# Patient Record
(demographics unavailable — no encounter records)

---

## 2024-12-01 NOTE — PHYSICAL EXAM
[No Acute Distress] : no acute distress [Well Nourished] : well nourished [Well Developed] : well developed [Well-Appearing] : well-appearing [Normal Sclera/Conjunctiva] : normal sclera/conjunctiva [PERRL] : pupils equal round and reactive to light [EOMI] : extraocular movements intact [Normal Outer Ear/Nose] : the outer ears and nose were normal in appearance [Normal Oropharynx] : the oropharynx was normal [No JVD] : no jugular venous distention [No Lymphadenopathy] : no lymphadenopathy [Supple] : supple [Thyroid Normal, No Nodules] : the thyroid was normal and there were no nodules present [No Respiratory Distress] : no respiratory distress  [No Accessory Muscle Use] : no accessory muscle use [Clear to Auscultation] : lungs were clear to auscultation bilaterally [Normal Rate] : normal rate  [Normal S1, S2] : normal S1 and S2 [Regular Rhythm] : with a regular rhythm [No Murmur] : no murmur heard [No Carotid Bruits] : no carotid bruits [No Abdominal Bruit] : a ~M bruit was not heard ~T in the abdomen [No Varicosities] : no varicosities [Pedal Pulses Present] : the pedal pulses are present [No Edema] : there was no peripheral edema [No Extremity Clubbing/Cyanosis] : no extremity clubbing/cyanosis [No Palpable Aorta] : no palpable aorta [Soft] : abdomen soft [Non Tender] : non-tender [Non-distended] : non-distended [No Masses] : no abdominal mass palpated [No HSM] : no HSM [Normal Bowel Sounds] : normal bowel sounds [Normal Posterior Cervical Nodes] : no posterior cervical lymphadenopathy [Normal Anterior Cervical Nodes] : no anterior cervical lymphadenopathy [No CVA Tenderness] : no CVA  tenderness [No Spinal Tenderness] : no spinal tenderness [No Joint Swelling] : no joint swelling [Grossly Normal Strength/Tone] : grossly normal strength/tone [No Rash] : no rash [Coordination Grossly Intact] : coordination grossly intact [No Focal Deficits] : no focal deficits [Normal Gait] : normal gait [Normal Affect] : the affect was normal [Deep Tendon Reflexes (DTR)] : deep tendon reflexes were 2+ and symmetric [Normal Insight/Judgement] : insight and judgment were intact

## 2024-12-02 NOTE — HISTORY OF PRESENT ILLNESS
[FreeTextEntry1] : JAYLA DAY is a 56 year old female here for a follow up visit. [de-identified] : She has a history of high cholesterol, migraine HA, GERD, anxiety, and depression. At her annual physical in 5/2024 her BMI was 27.7 and she requested a prescription for Zepbound. She was prescribed 2.5 mg weekly which she took for 2 months. In July her dose was increased to 5 mg. It was increased again to 7.5 mg in August, 10 mg in September, and 12.5 mg in November. She has had one follow up visit in August at which time she had lost 15 pounds on the 5 mg dose and her BMI was normal at 25.3.

## 2024-12-02 NOTE — PLAN
[FreeTextEntry1] : Check labs as above. Will adjust any medications based upon lab results. Recommend she decrease her Zepbound dose with the goal of getting back to 5 mg which was an effective dose. Ideally she should be able to stop the GLP-1 agonist completely  Reviewed age-appropriate preventive screening tests with patient.  Discussed clean eating (eg Mediterranean style eating plan) and regular exercise/staying as physically active as possible.  Include balance exercises and strength training and core strengthening exercises for bone health and to decrease risk for falls.  Reviewed importance of good self care (e.g. meditation, yoga, adequate rest, regular exercise, magnesium, clean eating, etc.).  Follow up for next physical in 3/2025 as scheduled.  Face-to-face time spent with patient, over half in discussion of the above diagnoses and treatment plan: 30 minutes.

## 2024-12-02 NOTE — ASSESSMENT
[FreeTextEntry1] : She has a history of high cholesterol, migraine HA, GERD, anxiety, and depression. She is taking Zepbound for weight loss. At her last visit in 8/2024 she had a normal BMI on the 5 mg dose. Despite this, her dose continued to be increased on a monthly basis.  Her weight today is 137 and her BMI 22.11. She is happy with her weight loss but would like to lose another few pounds if possible.  Her insurance is changing and she is not sure if Zepbound will be covered on her new insurance. We discussed that the goal is always to try to taper off the GLP-1 agonist and not have to stay on this lifelong. If her new insurance does not cover Zepbound this may accelerate that process. If they do cover it, she would like to say on 12.5 mg for another few months before decreasing the dose.

## 2024-12-02 NOTE — HEALTH RISK ASSESSMENT
[0] : 2) Feeling down, depressed, or hopeless: Not at all (0) [PHQ-2 Negative - No further assessment needed] : PHQ-2 Negative - No further assessment needed [Never] : Never [[id number]] : 0

## 2024-12-02 NOTE — HEALTH RISK ASSESSMENT
[0] : 2) Feeling down, depressed, or hopeless: Not at all (0) [PHQ-2 Negative - No further assessment needed] : PHQ-2 Negative - No further assessment needed [Never] : Never [[id number]] : 0

## 2024-12-02 NOTE — HISTORY OF PRESENT ILLNESS
[FreeTextEntry1] : JAYLA DAY is a 56 year old female here for a follow up visit. [de-identified] : She has a history of high cholesterol, migraine HA, GERD, anxiety, and depression. At her annual physical in 5/2024 her BMI was 27.7 and she requested a prescription for Zepbound. She was prescribed 2.5 mg weekly which she took for 2 months. In July her dose was increased to 5 mg. It was increased again to 7.5 mg in August, 10 mg in September, and 12.5 mg in November. She has had one follow up visit in August at which time she had lost 15 pounds on the 5 mg dose and her BMI was normal at 25.3.

## 2024-12-19 NOTE — PHYSICAL EXAM
[Normal] : affect was normal and insight and judgment were intact [de-identified] : no tenderness with palpation over the base of left thumb, no redness

## 2024-12-19 NOTE — HISTORY OF PRESENT ILLNESS
[FreeTextEntry8] : - 2 days ago woke up and had mild left hand pain, went away  - the next night woke up with severe left hand pain, swollen at the base of the thumb - couldn't bend fingers due to swelling, wasn't able to hold her phone  - had mild numbness and tingling in the fingers, which has resolved  - denies trauma  - denies redness - got better yesterday by itself  - didn't take a photo of the hand when it was bad  - took Tylenol on first day and advil yesterday  - no hx of gout - diet is healthy

## 2024-12-19 NOTE — PLAN
[FreeTextEntry1] : - could be de quervain's tenosynovitis - rest - if happens again - ice, NSAIDs and brace  - take photo if happens again to make sure it isn't the joint that's swollen and red, however, patient states that she doesn't think it was the joint this time

## 2025-03-06 NOTE — HISTORY OF PRESENT ILLNESS
[FreeTextEntry1] : CPE. [de-identified] : Patient is a 57yo female with PMH elevated BMI, HLD, anxiety/depression who presents to the office for CPE.   Last CPE:  05/20/2024 with myself.  GYN Exam:  2024, Dr. Gallagher.  Mammogram:  07/2024, breast US as well, BIRADS-2 ([facility]).  Breast MRI no longer needed (10 years since surgery), Dr. Paulson. DEXA:  due for initial screening and pt interested, will give RX. Colonoscopy: 10/2023, Dr. Fink; s/p polypectomy, internal hemorrhoids; rpt 5 years.  Ophthalmology:  2024. Dermatology:  [facility], formerly Dr. Celaya, will be scheduling with Jeremi.  Dentist:  [facility]. Shingles: Shingrix x2; 10/2018, 01/2019.  Flu:  fall 2024. Tdap: 01/2020.  COVID:  received.  LMP:  2021.  Neurology:  formerly Dr. Hassan, scheduled with new Neurologist at the end of this month, Dr. Jules Obregon.  Seen for migraine headaches.   Pt has had great success on GLP-1 medications.  Unfortunately, her insurance changed and they are no longer covered under her new insurance.  Pt is very concerned that she will have weight gain off medications. Pt is at a healthy weight for her height currently and has been maintaining weight loss for now, but is interested in continuing GLP-1 medications if possible.

## 2025-03-06 NOTE — HEALTH RISK ASSESSMENT
[Never] : Never [HIV test declined] : HIV test declined [Hepatitis C test declined] : Hepatitis C test declined [None] : None [With Family] : lives with family [Employed] : employed [] :  [# Of Children ___] : has [unfilled] children [Feels Safe at Home] : Feels safe at home [Fully functional (bathing, dressing, toileting, transferring, walking, feeding)] : Fully functional (bathing, dressing, toileting, transferring, walking, feeding) [Fully functional (using the telephone, shopping, preparing meals, housekeeping, doing laundry, using] : Fully functional and needs no help or supervision to perform IADLs (using the telephone, shopping, preparing meals, housekeeping, doing laundry, using transportation, managing medications and managing finances) [Yes] : Yes [2 - 4 times a month (2 pts)] : 2-4 times a month (2 points) [1 or 2 (0 pts)] : 1 or 2 (0 points) [Never (0 pts)] : Never (0 points) [No] : In the past 12 months have you used drugs other than those required for medical reasons? No [No falls in past year] : Patient reported no falls in the past year [0] : 2) Feeling down, depressed, or hopeless: Not at all (0) [PHQ-2 Negative - No further assessment needed] : PHQ-2 Negative - No further assessment needed [Audit-CScore] : 2 [de-identified] : wasn't exercising for a while (had to stop gym membership after  lost his job), but just started exercising regularly. [de-identified] : well balanced but feels appetite returning since Zepbound dosing is sporadic since insurance stopped covering it. [[id number]] : 0 [EyeExamDate] : 2024 [Patient reported mammogram was normal] : Patient reported mammogram was normal [Patient reported PAP Smear was normal] : Patient reported PAP Smear was normal [Patient reported colonoscopy was normal] : Patient reported colonoscopy was normal [Change in mental status noted] : No change in mental status noted [Language] : denies difficulty with language [MammogramDate] : 07/2024 [PapSmearDate] : 2024 [ColonoscopyDate] : 10/2023 [FreeTextEntry2] : part time, [employer]

## 2025-03-06 NOTE — ASSESSMENT
[FreeTextEntry1] : Patient is a 55yo female with PMH elevated BMI, HLD, anxiety/depression who presents to the office for CPE.  Health Maintenance - Due for initial post-menopausal DEXA, RX provided. - Fasting labs drawn in office. - EKG performed in office NSR, RBBB, stable from prior, no acute ST/T changes, no arrhythmias.  Pt denies cardiac complaints. - Eat plenty of fruits and vegetables, especially deeply colored fruits/vegetables (such as leafy greens, peaches) that are more nutrient-dense.  Continue to work hard on diet and exercise, limiting/avoiding saturated fat, fatty foods, greasy foods, red meats, white flour-based carbohydrates (cookies, cakes, white bread, white rice), and added sugars.  Chose whole grain foods and products made with whole grains over refined grains and white flour-based carbohydrates.  Avoid beverages and food with added sugar.  Limit salt intake to improve blood pressure.  Limit alcohol intake. - Try and incorporate a minimum of 150 minutes of exercise per week of moderate activity.  You should also try to incorporate ~20 minutes of weight training to your regimen at least 2-3 times per week.  Hx elevated BMI - Improved with Zepbound, now BMI normal. - Pt interested in continuing Zepbound, however medication not covered under her insurance. - Discussed with pt she is at healthy BMI and should not lose more weight.  Pt would like to continue Zepbound for weight loss maintenance and understands she should not lose any more weight. - Will discuss with her , but plan would be to receive medication from [facility] [facility] ( of Zepbound) for discounted price.  This is NOT a compounded form of the medication and pt understands compounded forms of Tirzepatide are NOT FDA regulated and therefore not recommended by our office.  We will not be writing a prescripiton for compounded Tirzepatide, will only provide RX for Zepbound.  Call the office or go to the ED immediately if you develop new, worsening or concerning symptoms including high fever, severe headache/worst headache of your life, confusion, dizziness/lightheadedness, loss of consciousness, severe chest pain, difficulty breathing, shortness of breath, severe abdominal pain, excessive vomiting/diarrhea, inability to feel/move the extremities, or any other concerning symptoms.

## 2025-07-03 NOTE — ASSESSMENT
[FreeTextEntry1] : She has a history of high cholesterol, migraine HA, GERD, anxiety, and depression. She is using Zepbound for weight control.  She is here to repeat her labs from her last visit. She was advised to check her labs every 3 months while she is on Zepbound.  Her 10 year ASCVD risk is calculated at 1.38%. This is lower risk range and so statin therapy is not indicated at this time. Recommend Mediterranean style plant based eating and regular exercise +/- can consider use of psyllium husk fiber supplement (eg Metamucil etc).   5 minutes was spent on ASCVD risk discussion, assessment, and plan with the patient.   She reports leg cramps mainly at night. She feels that she is well hydrated and stretches regularly. Will check her iron and electrolytes.

## 2025-07-03 NOTE — HEALTH RISK ASSESSMENT
[0] : 2) Feeling down, depressed, or hopeless: Not at all (0) [PHQ-2 Negative - No further assessment needed] : PHQ-2 Negative - No further assessment needed [Never] : Never [[id number]] : 0

## 2025-07-03 NOTE — PLAN
[FreeTextEntry1] : Continue all medications as prescribed. Check labs as above. Will adjust any medications based upon lab results.  Reviewed age-appropriate preventive screening tests with patient.  Discussed clean eating (eg Mediterranean style eating plan) and regular exercise/staying as physically active as possible.  Include balance exercises and strength training and core strengthening exercises for bone health and to decrease risk for falls.  Reviewed importance of good self care (e.g. meditation, yoga, adequate rest, regular exercise, magnesium, clean eating, etc.).  Follow up for next physical in 3/2026 as scheduled or sooner based on lab results.

## 2025-07-03 NOTE — HEALTH RISK ASSESSMENT
[0] : 2) Feeling down, depressed, or hopeless: Not at all (0) [PHQ-2 Negative - No further assessment needed] : PHQ-2 Negative - No further assessment needed [Never] : Never [[id number]] : 0

## 2025-07-03 NOTE — HISTORY OF PRESENT ILLNESS
[FreeTextEntry1] : JAYLA DAY is a 57 year old female here for a follow up visit.  [de-identified] : She has a history of high cholesterol, migraine HA, GERD, anxiety, and depression. At her annual physical in 5/2024 her BMI was 27.7 and she requested a prescription for Zepbound. She has been taking this for over a year and has lost over 35 pounds. She is now getting this through [facility] since her insurance does not cover Zepbound.  Her last visit was in 3/2025 for her annual physical. Her cholesterol was higher than previous but her 10 year ASCVD risk was still low. She was advised to work harder on diet and exercise.

## 2025-07-03 NOTE — HISTORY OF PRESENT ILLNESS
[FreeTextEntry1] : JAYLA DAY is a 57 year old female here for a follow up visit.  [de-identified] : She has a history of high cholesterol, migraine HA, GERD, anxiety, and depression. At her annual physical in 5/2024 her BMI was 27.7 and she requested a prescription for Zepbound. She has been taking this for over a year and has lost over 35 pounds. She is now getting this through [facility] since her insurance does not cover Zepbound.  Her last visit was in 3/2025 for her annual physical. Her cholesterol was higher than previous but her 10 year ASCVD risk was still low. She was advised to work harder on diet and exercise.